# Patient Record
Sex: FEMALE | Race: WHITE | NOT HISPANIC OR LATINO | URBAN - METROPOLITAN AREA
[De-identification: names, ages, dates, MRNs, and addresses within clinical notes are randomized per-mention and may not be internally consistent; named-entity substitution may affect disease eponyms.]

---

## 2020-06-03 ENCOUNTER — APPOINTMENT (OUTPATIENT)
Dept: RADIOLOGY | Facility: IMAGING CENTER | Age: 60
End: 2020-06-03
Attending: PHYSICIAN ASSISTANT
Payer: COMMERCIAL

## 2020-06-03 ENCOUNTER — OFFICE VISIT (OUTPATIENT)
Dept: URGENT CARE | Facility: PHYSICIAN GROUP | Age: 60
End: 2020-06-03
Payer: COMMERCIAL

## 2020-06-03 VITALS
RESPIRATION RATE: 18 BRPM | OXYGEN SATURATION: 96 % | DIASTOLIC BLOOD PRESSURE: 98 MMHG | WEIGHT: 194 LBS | HEART RATE: 114 BPM | SYSTOLIC BLOOD PRESSURE: 156 MMHG | TEMPERATURE: 98.7 F | BODY MASS INDEX: 33.12 KG/M2 | HEIGHT: 64 IN

## 2020-06-03 DIAGNOSIS — M79.641 HAND PAIN, RIGHT: ICD-10-CM

## 2020-06-03 PROCEDURE — 73130 X-RAY EXAM OF HAND: CPT | Mod: TC,FY,RT | Performed by: PHYSICIAN ASSISTANT

## 2020-06-03 PROCEDURE — 99203 OFFICE O/P NEW LOW 30 MIN: CPT | Performed by: PHYSICIAN ASSISTANT

## 2020-06-03 RX ORDER — CARVEDILOL 25 MG/1
25 TABLET ORAL
COMMUNITY

## 2020-06-03 RX ORDER — HYDROCHLOROTHIAZIDE 12.5 MG/1
12.5 TABLET ORAL DAILY
COMMUNITY

## 2020-06-03 RX ORDER — LANSOPRAZOLE 30 MG/1
30 CAPSULE, DELAYED RELEASE ORAL
COMMUNITY

## 2020-06-03 RX ORDER — SIMVASTATIN 20 MG
20 TABLET ORAL
COMMUNITY

## 2020-06-03 RX ORDER — IRBESARTAN 150 MG/1
150 TABLET ORAL NIGHTLY
COMMUNITY

## 2020-06-03 NOTE — PROGRESS NOTES
Chief Complaint   Patient presents with   • Hand Pain     felt hand pop when getting out of vehicle (R)        HISTORY OF PRESENT ILLNESS: Patient is a 59 y.o. female who presents today for the following:    Patient comes in for evaluation of right hand pain.  [place] the [place] and when she lifted her hand off the steering well she felt a pop and had immediate pain over the third MCP.  She quickly developed swelling and redness.  She has pain with range of motion and any pressure against the area.  She has no history of the same.    There are no active problems to display for this patient.      Allergies:Sulfa drugs and Vicodin [apap-fd&c yellow #10 [place]-hydrocodone]    Current Outpatient Medications Ordered in Epic   Medication Sig Dispense Refill   • irbesartan (AVAPRO) 150 MG Tab Take 150 mg by mouth every evening.     • carvedilol (COREG) 25 MG Tab Take 25 mg by mouth.     • hydroCHLOROthiazide (HYDRODIURIL) 12.5 MG tablet Take 12.5 mg by mouth every day.     • simvastatin (ZOCOR) 20 MG Tab Take 20 mg by mouth.     • lansoprazole (PREVACID) 30 MG CAPSULE DELAYED RELEASE Take 30 mg by mouth.       No current Epic-ordered facility-administered medications on file.        History reviewed. No pertinent past medical history.    Social History     Tobacco Use   • Smoking status: Current Every Day Smoker     Packs/day: 1.00     Types: Cigarettes   • Smokeless tobacco: Never Used   Substance Use Topics   • Alcohol use: Not Currently   • Drug use: Not Currently       No family status information on file.   History reviewed. No pertinent family history.    Review of Systems:   Constitutional ROS: No unexpected change in weight, No weakness, No fatigue  Pulmonary ROS: No chronic cough, sputum, or hemoptysis, No dyspnea on exertion, No wheezing  Cardiovascular ROS: No diaphoresis, No edema, No palpitations  Musculoskeletal/Extremities ROS: Right hand pain.  Hematologic/Lymphatic ROS: No chills, No  "night sweats, No weight loss  Skin/Integumentary ROS: No edema, No evidence of rash, No itching      Exam:  /98   Pulse (!) 114   Temp 37.1 °C (98.7 °F)   Resp 18   Ht 1.626 m (5' 4\")   Wt 88 kg (194 lb)   SpO2 96%   General: Well developed, well nourished. No distress.    HENT: Head is grossly normal.  Pulmonary: Unlabored respiratory effort.   Neurologic: Grossly nonfocal. No facial asymmetry noted.  Musculoskeletal: Soft tissue swelling and tenderness noted around the third MCP extending into the third metacarpal.  Unable to make a fist due to pain.  Patient does have extension and flexion of all digits on the right hand.  No sensory deficit noted.  Brisk capillary refill.  Skin: Warm, dry, good turgor. No rashes in visible areas.   Psych: Normal mood. Alert and oriented to person, place and time.    Right hand x-ray, per my read:  No acute fracture dislocation noted.  No avulsion fracture noted.    Assessment/Plan:  Discussed appropriate over-the-counter symptomatic medication.  Follow-up with primary care upon returning home for further evaluation management if symptoms persist.  1. Hand pain, right  DX-HAND 3+ RIGHT       "

## 2020-06-11 ENCOUNTER — SUPERVISING PHYSICIAN REVIEW (OUTPATIENT)
Dept: URGENT CARE | Facility: PHYSICIAN GROUP | Age: 60
End: 2020-06-11